# Patient Record
Sex: MALE | Race: WHITE | NOT HISPANIC OR LATINO | Employment: FULL TIME | URBAN - METROPOLITAN AREA
[De-identification: names, ages, dates, MRNs, and addresses within clinical notes are randomized per-mention and may not be internally consistent; named-entity substitution may affect disease eponyms.]

---

## 2017-10-03 ENCOUNTER — APPOINTMENT (OUTPATIENT)
Dept: CT IMAGING | Facility: HOSPITAL | Age: 47
End: 2017-10-03

## 2017-10-03 ENCOUNTER — HOSPITAL ENCOUNTER (EMERGENCY)
Facility: HOSPITAL | Age: 47
Discharge: HOME | End: 2017-10-03
Attending: EMERGENCY MEDICINE | Admitting: EMERGENCY MEDICINE

## 2017-10-03 VITALS
WEIGHT: 165 LBS | DIASTOLIC BLOOD PRESSURE: 90 MMHG | HEART RATE: 88 BPM | BODY MASS INDEX: 25.9 KG/M2 | RESPIRATION RATE: 16 BRPM | TEMPERATURE: 98.4 F | SYSTOLIC BLOOD PRESSURE: 131 MMHG | OXYGEN SATURATION: 96 % | HEIGHT: 67 IN

## 2017-10-03 DIAGNOSIS — R56.9 SEIZURE (HCC): Primary | ICD-10-CM

## 2017-10-03 LAB
ALBUMIN SERPL-MCNC: 4.3 G/DL (ref 3.5–5.2)
ALBUMIN/GLOB SERPL: 1.8 G/DL
ALP SERPL-CCNC: 97 U/L (ref 39–117)
ALT SERPL W P-5'-P-CCNC: 21 U/L (ref 1–41)
AMPHET+METHAMPHET UR QL: NEGATIVE
ANION GAP SERPL CALCULATED.3IONS-SCNC: 12.8 MMOL/L
AST SERPL-CCNC: 20 U/L (ref 1–40)
BARBITURATES UR QL SCN: NEGATIVE
BASOPHILS # BLD AUTO: 0.04 10*3/MM3 (ref 0–0.2)
BASOPHILS NFR BLD AUTO: 0.4 % (ref 0–1.5)
BENZODIAZ UR QL SCN: NEGATIVE
BILIRUB SERPL-MCNC: 0.3 MG/DL (ref 0.1–1.2)
BUN BLD-MCNC: 15 MG/DL (ref 6–20)
BUN/CREAT SERPL: 13.5 (ref 7–25)
CALCIUM SPEC-SCNC: 10.1 MG/DL (ref 8.6–10.5)
CANNABINOIDS SERPL QL: POSITIVE
CHLORIDE SERPL-SCNC: 103 MMOL/L (ref 98–107)
CO2 SERPL-SCNC: 28.2 MMOL/L (ref 22–29)
COCAINE UR QL: NEGATIVE
CREAT BLD-MCNC: 1.11 MG/DL (ref 0.76–1.27)
DEPRECATED RDW RBC AUTO: 42.9 FL (ref 37–54)
EOSINOPHIL # BLD AUTO: 0.14 10*3/MM3 (ref 0–0.7)
EOSINOPHIL NFR BLD AUTO: 1.4 % (ref 0.3–6.2)
ERYTHROCYTE [DISTWIDTH] IN BLOOD BY AUTOMATED COUNT: 12.5 % (ref 11.5–14.5)
ETHANOL BLD-MCNC: <10 MG/DL (ref 0–10)
ETHANOL UR QL: <0.01 %
GFR SERPL CREATININE-BSD FRML MDRD: 71 ML/MIN/1.73
GLOBULIN UR ELPH-MCNC: 2.4 GM/DL
GLUCOSE BLD-MCNC: 128 MG/DL (ref 65–99)
HCT VFR BLD AUTO: 46 % (ref 40.4–52.2)
HGB BLD-MCNC: 16 G/DL (ref 13.7–17.6)
IMM GRANULOCYTES # BLD: 0.02 10*3/MM3 (ref 0–0.03)
IMM GRANULOCYTES NFR BLD: 0.2 % (ref 0–0.5)
LYMPHOCYTES # BLD AUTO: 3.44 10*3/MM3 (ref 0.9–4.8)
LYMPHOCYTES NFR BLD AUTO: 34.4 % (ref 19.6–45.3)
MCH RBC QN AUTO: 32.9 PG (ref 27–32.7)
MCHC RBC AUTO-ENTMCNC: 34.8 G/DL (ref 32.6–36.4)
MCV RBC AUTO: 94.5 FL (ref 79.8–96.2)
METHADONE UR QL SCN: NEGATIVE
MONOCYTES # BLD AUTO: 0.69 10*3/MM3 (ref 0.2–1.2)
MONOCYTES NFR BLD AUTO: 6.9 % (ref 5–12)
NEUTROPHILS # BLD AUTO: 5.67 10*3/MM3 (ref 1.9–8.1)
NEUTROPHILS NFR BLD AUTO: 56.7 % (ref 42.7–76)
OPIATES UR QL: NEGATIVE
OXYCODONE UR QL SCN: NEGATIVE
PLATELET # BLD AUTO: 234 10*3/MM3 (ref 140–500)
PMV BLD AUTO: 10.9 FL (ref 6–12)
POTASSIUM BLD-SCNC: 3.6 MMOL/L (ref 3.5–5.2)
PROT SERPL-MCNC: 6.7 G/DL (ref 6–8.5)
RBC # BLD AUTO: 4.87 10*6/MM3 (ref 4.6–6)
SODIUM BLD-SCNC: 144 MMOL/L (ref 136–145)
WBC NRBC COR # BLD: 10 10*3/MM3 (ref 4.5–10.7)

## 2017-10-03 PROCEDURE — 85025 COMPLETE CBC W/AUTO DIFF WBC: CPT | Performed by: NURSE PRACTITIONER

## 2017-10-03 PROCEDURE — 80307 DRUG TEST PRSMV CHEM ANLYZR: CPT | Performed by: NURSE PRACTITIONER

## 2017-10-03 PROCEDURE — 96361 HYDRATE IV INFUSION ADD-ON: CPT

## 2017-10-03 PROCEDURE — 70450 CT HEAD/BRAIN W/O DYE: CPT

## 2017-10-03 PROCEDURE — 96360 HYDRATION IV INFUSION INIT: CPT

## 2017-10-03 PROCEDURE — 99284 EMERGENCY DEPT VISIT MOD MDM: CPT

## 2017-10-03 PROCEDURE — 80053 COMPREHEN METABOLIC PANEL: CPT | Performed by: NURSE PRACTITIONER

## 2017-10-03 RX ORDER — LEVETIRACETAM 500 MG/1
1000 TABLET ORAL 2 TIMES DAILY
COMMUNITY

## 2017-10-03 RX ADMIN — SODIUM CHLORIDE 1000 ML: 9 INJECTION, SOLUTION INTRAVENOUS at 22:04

## 2017-10-04 NOTE — ED PROVIDER NOTES
The patient presents complaining of a seizure that occurred this AM after taking his keppra, and a second seizure on arrival to the ER. Pt states that he feels as baseline currently, and denies any pain, discomfort, or other symptoms. Pt states that he was recently our of his keppra, but has since had it refilled.     Physical Exam:  Cardiovascular: within normal limits  Lungs: CTAB  Abd: soft, nontender  Pt is resting comfortably, in no distress, and without focal neurologic deficit    Hx of EtOH withdrawal with seizures, is on keppra. Pt was recently evaluated at [facility] with a negative workup, and was restarted on his keppra.     Radiology:   CT head: negative    Plan:  Will d/c the pt. Advised the pt to avoid EtOH and to continue taking his keppra.     I supervised care provided by the midlevel provider.  We have discussed this patient's history, physical exam, and treatment plan.  I have reviewed the note and personally saw and examined the patient and agree with the plan of care.    Documentation assistance provided by bayron Sagastume.  Information recorded by the bayron was done at my direction and has been verified and validated by me.           Meet Sagastume  10/03/17 [id number]       Scott Velasquez MD  10/04/17 7204

## 2017-10-04 NOTE — ED TRIAGE NOTES
Pt ran out of keppra on Wednesday. Started seizing Thursday. Pt was seen at [facility] on Friday where he received IV Keppra and a refill on prescription. Family report he has had multiple seizures and appears to be pot ictal. Pt currently slumped in wheelchair, mumbled speech and incoherent.

## 2017-10-04 NOTE — ED PROVIDER NOTES
EMERGENCY DEPARTMENT ENCOUNTER    CHIEF COMPLAINT  Chief Complaint: seizures  History given by: patient, family  History limited by: N/A  Room Number: 14/14  PMD: No Known Provider  Neurologist- Dr Pollard     HPI:  Pt is a 47 y.o. male who has longstanding hx of ETOH withdrawal seizures due to which he is on 1000mg keppra BID. Pt states that he ran out of his keppra about 6 days ago. Per family, about 2 days later, pt began seizing and was seen at [facility]. Family states that at the time, pt received IV keppra and was given a refill of his keppra prescription. Since then, he has had multiple seizures daily, most recently today. During the seizures, pt has preceding sx of RUE tingling after which he becomes unresponsive, has generalized shaking, and occasionally bites his tongue. Pt states that he sustained head trauma during yesterday's seizure and last drank ETOH about 5 days ago. Family notes that pt had seizure today prior to ED arrival and currently appears post ictal with sx of confusion. Family also states that pt is compliant with his keppra (reports taking both doses today). Pt has had increased stress and denies fevers, focal weakness, numbness, N/V/D, diarrhea, chest pain, trouble breathing, headache, neck pain, back pain, abd pain, and pain and difficulty with urination. There are no other complaints at this time.     Duration: started about 4 days ago  Timing: intermittent  Location: neuro  Radiation: none  Quality: none  Intensity/Severity: moderate  Progression: unchanged  Associated Symptoms: before seizure- RUE tingling; during seizure- unresponsiveness, generalized tingling, occasional tongue biting; after seizure- confusion  Aggravating Factors: none  Alleviating Factors: none  Previous Episodes: Pt states that he has longstanding hx of ETOH withdrawal seizures due to which he takes keppra.   Treatment before arrival: Per family, pt has taken both his keppra doses today.     PAST MEDICAL  HISTORY  Active Ambulatory Problems     Diagnosis Date Noted   • No Active Ambulatory Problems     Resolved Ambulatory Problems     Diagnosis Date Noted   • No Resolved Ambulatory Problems     Past Medical History:   Diagnosis Date   • Asthma    • Seizures        PAST SURGICAL HISTORY  Past Surgical History:   Procedure Laterality Date   • HERNIA REPAIR     • KNEE SURGERY         FAMILY HISTORY  History reviewed. No pertinent family history.    SOCIAL HISTORY  Social History     Social History   • Marital status: Single     Spouse name: N/A   • Number of children: N/A   • Years of education: N/A     Occupational History   • Not on file.     Social History Main Topics   • Smoking status: Current Every Day Smoker     Packs/day: 1.00     Types: Cigarettes   • Smokeless tobacco: Not on file   • Alcohol use Yes      Comment: occationally   • Drug use: Yes     Special: Marijuana      Comment: daily   • Sexual activity: Defer     Other Topics Concern   • Not on file     Social History Narrative   • No narrative on file         ALLERGIES  Review of patient's allergies indicates no known allergies.    REVIEW OF SYSTEMS  Review of Systems   Constitutional: Negative.  Negative for activity change, appetite change ( decreased), chills and fever.   HENT: Negative for congestion, ear pain, rhinorrhea, sinus pressure and sore throat.    Eyes: Negative.    Respiratory: Negative.  Negative for cough and shortness of breath.    Cardiovascular: Negative.  Negative for chest pain, palpitations and leg swelling ( pedal).   Gastrointestinal: Negative for abdominal pain, diarrhea, nausea and vomiting.   Endocrine: Negative.    Genitourinary: Negative.  Negative for decreased urine volume, difficulty urinating, dysuria, frequency and urgency.   Musculoskeletal: Negative.  Negative for back pain.   Skin: Negative.  Negative for rash.   Allergic/Immunologic: Negative.    Neurological: Positive for seizures. Negative for dizziness, weakness,  light-headedness, numbness and headaches.        RUE tingling (before seizure); unresponsiveness and generalized shaking (during seizure)   Hematological: Negative.    Psychiatric/Behavioral: Positive for confusion (after seizure). The patient is not nervous/anxious.    All other systems reviewed and are negative.      PHYSICAL EXAM  ED Triage Vitals   Temp Heart Rate Resp BP SpO2   10/03/17 2111 10/03/17 2111 10/03/17 2111 10/03/17 2115 10/03/17 2111   98.4 °F (36.9 °C) 81 16 136/82 98 % WNL      Temp src Heart Rate Source Patient Position BP Location FiO2 (%)   10/03/17 2111 10/03/17 2111 10/03/17 2115 10/03/17 2126 --   Tympanic Monitor Lying Right arm        Physical Exam   Constitutional: He is oriented to person, place, and time and well-developed, well-nourished, and in no distress. No distress.   HENT:   Head: Normocephalic and atraumatic.   Mouth/Throat: Oropharynx is clear and moist and mucous membranes are normal.   Eyes: Pupils are equal, round, and reactive to light.   Neck: Normal range of motion. Neck supple.   No c-spine tenderness   Cardiovascular: Normal rate, regular rhythm and normal heart sounds.    Pulmonary/Chest: Effort normal and breath sounds normal. No respiratory distress. He has no wheezes.   Abdominal: Soft. Bowel sounds are normal. There is no tenderness.   Musculoskeletal: Normal range of motion. He exhibits no edema.   Neurological: He is alert and oriented to person, place, and time. He has normal motor skills and normal sensation.   Slow to respond, answers questions appropriately, no seizure activity noted, nonfocal neuro exam   Skin: Skin is warm and dry. No rash noted.   Psychiatric: Mood and affect normal.   Nursing note and vitals reviewed.      LAB RESULTS  Recent Results (from the past 24 hour(s))   Comprehensive Metabolic Panel    Collection Time: 10/03/17 10:03 PM   Result Value Ref Range    Glucose 128 (H) 65 - 99 mg/dL    BUN 15 6 - 20 mg/dL    Creatinine 1.11 0.76 -  1.27 mg/dL    Sodium 144 136 - 145 mmol/L    Potassium 3.6 3.5 - 5.2 mmol/L    Chloride 103 98 - 107 mmol/L    CO2 28.2 22.0 - 29.0 mmol/L    Calcium 10.1 8.6 - 10.5 mg/dL    Total Protein 6.7 6.0 - 8.5 g/dL    Albumin 4.30 3.50 - 5.20 g/dL    ALT (SGPT) 21 1 - 41 U/L    AST (SGOT) 20 1 - 40 U/L    Alkaline Phosphatase 97 39 - 117 U/L    Total Bilirubin 0.3 0.1 - 1.2 mg/dL    eGFR Non African Amer 71 >60 mL/min/1.73    Globulin 2.4 gm/dL    A/G Ratio 1.8 g/dL    BUN/Creatinine Ratio 13.5 7.0 - 25.0    Anion Gap 12.8 mmol/L   Ethanol    Collection Time: 10/03/17 10:03 PM   Result Value Ref Range    Ethanol <10 0 - 10 mg/dL    Ethanol % <0.010 %   CBC Auto Differential    Collection Time: 10/03/17 10:03 PM   Result Value Ref Range    WBC 10.00 4.50 - 10.70 10*3/mm3    RBC 4.87 4.60 - 6.00 10*6/mm3    Hemoglobin 16.0 13.7 - 17.6 g/dL    Hematocrit 46.0 40.4 - 52.2 %    MCV 94.5 79.8 - 96.2 fL    MCH 32.9 (H) 27.0 - 32.7 pg    MCHC 34.8 32.6 - 36.4 g/dL    RDW 12.5 11.5 - 14.5 %    RDW-SD 42.9 37.0 - 54.0 fl    MPV 10.9 6.0 - 12.0 fL    Platelets 234 140 - 500 10*3/mm3    Neutrophil % 56.7 42.7 - 76.0 %    Lymphocyte % 34.4 19.6 - 45.3 %    Monocyte % 6.9 5.0 - 12.0 %    Eosinophil % 1.4 0.3 - 6.2 %    Basophil % 0.4 0.0 - 1.5 %    Immature Grans % 0.2 0.0 - 0.5 %    Neutrophils, Absolute 5.67 1.90 - 8.10 10*3/mm3    Lymphocytes, Absolute 3.44 0.90 - 4.80 10*3/mm3    Monocytes, Absolute 0.69 0.20 - 1.20 10*3/mm3    Eosinophils, Absolute 0.14 0.00 - 0.70 10*3/mm3    Basophils, Absolute 0.04 0.00 - 0.20 10*3/mm3    Immature Grans, Absolute 0.02 0.00 - 0.03 10*3/mm3       I ordered the above labs and reviewed the results      RADIOLOGY         CT Head Without Contrast (Final result) Result time: 10/03/17 22:14:52     Final result by Chris Chaudhari MD (10/03/17 22:14:52)     Impression:     1. No gross acute intracranial finding is demonstrated considering  excessive motion. Follow-up as above.            This study was  performed with techniques to keep radiation doses as low  as reasonably achievable (ALARA). Individualized dose reduction  techniques using automated exposure control or adjustment of mA and/or  kV according to the patient size were employed.      This report was finalized on 10/3/2017 10:14 PM by Chris Chaudhari MD.        Narrative:     CRANIAL CT SCAN WITHOUT CONTRAST     CLINICAL HISTORY: seizures/ hit head     COMPARISON: None.     TECHNIQUE: Radiation dose reduction techniques were utilized, including  automated exposure control and exposure modulation based on body size.  Multiple axial images of the head were obtained without contrast.      FINDINGS:  There are numerous images degraded by excessive patient  motion. A repeat exam either when the patient can fully cooperate or  with conscious sedation provided is strongly advised. Within these  limitations, no large area of hemorrhage is demonstrated. There is no  midline shift identified. There is some generalized cerebellar atrophy  with prominent megacisterna magna variant noted.  Ventricles do not  appear enlarged. Bone window images are also degraded by motion without  a displaced calvarial fracture appreciated.             I ordered the above noted radiological studies and reviewed the images on the PACS system.       PROGRESS AND CONSULTS  9:52 PM- Ordered IV fluids for hydration. Ordered CT head, blood work, BAL, and UDS for further evaluation.     11:08 PM- Reviewed pt's history and workup with Dr. Velasquez.  After a bedside evaluation; Dr Velasquez agrees with the plan of care.     11:19 PM- Rechecked pt. He is resting comfortably and is in no acute distress. He states that he is feeling fine. Pt has had no seizure activity in ED. Discussed with pt about all pertinent results including stable labs and nothing acute indicated on CT head. I offered admission for further care but pt declines, stating that he feels comfortable with going home. Counseled pt on  "the importance of remaining compliant with his keppra and stopping ETOH use. Informed pt of dx and plan for discharge. Instructed to f/u with neurologist closely for recheck and for further management (d/w pt about the importance of this). RTER warnings given. Pt understands and agrees with plan. Addressed all questions.        COURSE & MEDICAL DECISION MAKING  Pertinent Labs and Imaging studies that were ordered and reviewed are noted above.  Results were reviewed/discussed with the patient and they were also made aware of online assess.   Pt also made aware that some labs, such as cultures, will not be resulted during ER visit and follow up with PMD is necessary.     MEDICATIONS GIVEN IN ER  Medications   sodium chloride 0.9 % bolus 1,000 mL (1,000 mL Intravenous New Bag 10/3/17 2204)       /82 (BP Location: Right arm, Patient Position: Lying)  Pulse 99  Temp 98.4 °F (36.9 °C) (Tympanic)   Resp 16  Ht 67\" (170.2 cm)  Wt 165 lb (74.8 kg)  SpO2 95%  BMI 25.84 kg/m2    Discussed all results and noted any abnormalities with patient.  Discussed absoute need to recheck abnormalities and condition with neurologist.     Reviewed implications of results, diagnosis, meds, responsibility to follow up, warning signs and symptoms of possible worsening, potential complications and reasons to return to ER with patient    Discussed plan for discharge, as there is no emergent indication for admission.  Pt is agreeable and understands need for follow up and repeat testing.  Pt is aware that discharge does not mean that nothing is wrong but it indicates no emergency is present and they must continue care with neurologist.  Pt is discharged with instructions to follow up with primary care doctor to have their blood pressure rechecked.       DIAGNOSIS  Final diagnoses:   Seizure       FOLLOW UP   with your neurologist             I personally reviewed the past medical history, past surgical history, social history, family " history, current medications and allergies as they appear in this chart.  The scribe's note accurately reflects the work and decisions made by me.       Documentation assistance provided by bayron Aguilar for BECKY Kline.  Information recorded by the scribe was done at my direction and has been verified and validated by me.     Chata Aguilar  10/03/17 0009       BECKY Zimmer  10/03/17 2461